# Patient Record
Sex: MALE | Race: WHITE
[De-identification: names, ages, dates, MRNs, and addresses within clinical notes are randomized per-mention and may not be internally consistent; named-entity substitution may affect disease eponyms.]

---

## 2017-04-10 ENCOUNTER — HOSPITAL ENCOUNTER (OUTPATIENT)
Dept: HOSPITAL 35 - RAD | Age: 82
End: 2017-04-10
Attending: INTERNAL MEDICINE
Payer: COMMERCIAL

## 2017-04-10 DIAGNOSIS — R06.02: ICD-10-CM

## 2017-04-10 DIAGNOSIS — J32.9: Primary | ICD-10-CM

## 2017-04-11 ENCOUNTER — HOSPITAL ENCOUNTER (OUTPATIENT)
Dept: HOSPITAL 35 - RAD | Age: 82
End: 2017-04-11
Attending: INTERNAL MEDICINE
Payer: COMMERCIAL

## 2017-04-11 DIAGNOSIS — J98.6: Primary | ICD-10-CM

## 2017-05-25 ENCOUNTER — HOSPITAL ENCOUNTER (OUTPATIENT)
Dept: HOSPITAL 61 - PCVCCLINIC | Age: 82
Discharge: HOME | End: 2017-05-25
Attending: INTERNAL MEDICINE
Payer: COMMERCIAL

## 2017-05-25 DIAGNOSIS — I10: ICD-10-CM

## 2017-05-25 DIAGNOSIS — I49.5: ICD-10-CM

## 2017-05-25 DIAGNOSIS — E13.9: ICD-10-CM

## 2017-05-25 DIAGNOSIS — I48.0: Primary | ICD-10-CM

## 2017-05-25 DIAGNOSIS — Z79.899: ICD-10-CM

## 2017-05-25 DIAGNOSIS — E78.00: ICD-10-CM

## 2017-05-25 DIAGNOSIS — I44.2: ICD-10-CM

## 2017-05-25 DIAGNOSIS — I65.23: ICD-10-CM

## 2017-05-25 DIAGNOSIS — I25.10: ICD-10-CM

## 2017-05-25 PROCEDURE — 93005 ELECTROCARDIOGRAM TRACING: CPT

## 2017-05-25 PROCEDURE — 80061 LIPID PANEL: CPT

## 2017-05-25 PROCEDURE — G0463 HOSPITAL OUTPT CLINIC VISIT: HCPCS

## 2017-12-01 ENCOUNTER — HOSPITAL ENCOUNTER (OUTPATIENT)
Dept: HOSPITAL 61 - PCVCIMAG | Age: 82
Discharge: HOME | End: 2017-12-01
Attending: INTERNAL MEDICINE
Payer: COMMERCIAL

## 2017-12-01 DIAGNOSIS — I25.10: ICD-10-CM

## 2017-12-01 DIAGNOSIS — E11.9: ICD-10-CM

## 2017-12-01 DIAGNOSIS — Z95.0: ICD-10-CM

## 2017-12-01 DIAGNOSIS — I10: ICD-10-CM

## 2017-12-01 DIAGNOSIS — R06.00: ICD-10-CM

## 2017-12-01 DIAGNOSIS — I48.0: ICD-10-CM

## 2017-12-01 DIAGNOSIS — I35.1: Primary | ICD-10-CM

## 2017-12-01 PROCEDURE — 93306 TTE W/DOPPLER COMPLETE: CPT

## 2017-12-01 PROCEDURE — 93005 ELECTROCARDIOGRAM TRACING: CPT

## 2017-12-04 NOTE — PCVCIMAG
--------------- APPROVED REPORT --------------





Study performed:  12/01/2017 08:52:42



EXAM: Comprehensive 2D, Doppler, and color-flow 

Echocardiogram

Patient Location: Echo lab

Room #:  2Status:  routine



BSA:         2.14

HR: 60 bpm

Rhythm: Pacemaker



Other Information 

Study Quality: Technically DifficultGood



Risk Factors: 

Cardiac Risk Factors:  DM, 

Hyperlipidemia



Indications

Diabetes

Dyspnea 

Pacemaker

CAD

Hx sick sinus syndrome



2D Dimensions

LVEF(%):  64.63 (&gt;50%)

IVSd:  8.02 (7-11mm)LVOT Diam:  21.53 (18-24mm) 

LVDd:  40.32 mm

PWd:  7.24 (7-11mm)Ascending Ao:  32.33 (22-36mm)

LVDs:  26.27 (25-40mm)

Left Atrium:  23.38 (27-40mm)

Aortic Root:  29.98 mm

LV Single Plane 4CH:  53.61 %

LV Single Plane 2CH:  62.65 %[place] LVEF:  58.13 %

Biplane EF:  60.6 %



Volumes

Left Atrial Volume (Systole)

Single Plane 4CH:  69.04 mLSingle Plane 2CH:  64.44 mL

Biplane LA Volume:  69.00 mLLA ESV Index:  32.00 mL/m2



Aortic Valve

AoV Peak Tim.:  1.36 m/s

AO Peak Gr.:  7.40 mmHgLVOT Max PG:  3.33 mmHg

LVOT Max V:  0.91 m/s

DHAVAL Vmax: 2.44 cm2

AI Vmax:  3.97 m/s

AI [place]:  2.44 m/s2

AI PHT:  471.50 ms



Mitral Valve

E/A Ratio:  0.6

MV Decel. Time:  235.48 ms

MV E Max Tim.:  0.49 m/s

MV A Tim.:  0.89 m/s

IVRT:  103.81 ms



TDI

E/Medial E':  9.80

Medial E' Tim.:  0.05 m/s



Pulmonary Valve

PV Peak Tim.:  1.18 m/sPV Peak Gr.:  5.57 mmHg



Pulmonary Vein

P Vein S:    0.61 m/sP Vein A:  0.34 m/s

P Vein D:   0.25 m/sP Vein A Dur.:  100.3 msec

P Vein S/D Ratio:  2.44



Tricuspid Valve

TR Peak Tim.:  2.05 m/s

TR Peak Gr.:  16.84 mmHg

TV Vmax:  0.48 m/sPA Pressure:  24.00 mmHg



Left Ventricle

The left ventricle is normal size. There is normal LV segmental wall 

motion. There is normal left ventricular wall thickness. Left 

ventricular systolic function is normal. The left ventricular 

ejection fraction is within the normal range. LVEF is 50% range ant 

apical hypo Grade I - abnormal relaxation pattern.



Right Ventricle

The right ventricle is normal size. The right ventricular systolic 

function is normal.



Atria

The left atrium size is normal. Pacemaker lead is present in the 

right atrium.



Aortic Valve

Aortic valve is trileaflet. Aortic valve leaflets are mildly 

sclerotic. Mild aortic regurgitation. There is no aortic valvular 

stenosis.



Mitral Valve

The mitral valve is normal in structure. There is no mitral valve 

regurgitation noted. No evidence of mitral valve stenosis.



Tricuspid Valve

The tricuspid valve is normal in structure. Trace tricuspid 

regurgitation.



Pulmonic Valve

The pulmonary valve is normal in structure. There is no pulmonic 

valvular regurgitation.



Great Vessels

The aortic root is normal in size. The ascending aorta is normal in 

size. IVC is normal in size and collapses with &gt;50% 

inspiration



Pericardium

There is no pericardial effusion. There is no pleural 

effusion.



&lt;Conclusion&gt;

The left ventricle is normal size.

LVEF is 50% range

Grade I - abnormal relaxation pattern.

Pacemaker lead is present in the right atrium.

Mild aortic regurgitation.

There is no mitral valve regurgitation noted.

There is no pericardial effusion.

LVEF is 50% range ant apical hypo

## 2018-02-28 ENCOUNTER — HOSPITAL ENCOUNTER (OUTPATIENT)
Dept: HOSPITAL 61 - PCVCCLINIC | Age: 83
Discharge: HOME | End: 2018-02-28
Attending: INTERNAL MEDICINE
Payer: COMMERCIAL

## 2018-02-28 DIAGNOSIS — Z95.0: ICD-10-CM

## 2018-02-28 DIAGNOSIS — I25.10: Primary | ICD-10-CM

## 2018-02-28 DIAGNOSIS — I10: ICD-10-CM

## 2018-02-28 DIAGNOSIS — Z79.899: ICD-10-CM

## 2018-02-28 DIAGNOSIS — I77.9: ICD-10-CM

## 2018-02-28 DIAGNOSIS — E78.00: ICD-10-CM

## 2018-02-28 DIAGNOSIS — I49.5: ICD-10-CM

## 2018-02-28 DIAGNOSIS — I48.91: ICD-10-CM

## 2018-02-28 PROCEDURE — 80061 LIPID PANEL: CPT

## 2018-02-28 PROCEDURE — 93280 PM DEVICE PROGR EVAL DUAL: CPT

## 2018-11-14 ENCOUNTER — HOSPITAL ENCOUNTER (OUTPATIENT)
Dept: HOSPITAL 61 - PCVCCLINIC | Age: 83
Discharge: HOME | End: 2018-11-14
Attending: INTERNAL MEDICINE
Payer: COMMERCIAL

## 2018-11-14 DIAGNOSIS — I49.5: ICD-10-CM

## 2018-11-14 DIAGNOSIS — E11.9: ICD-10-CM

## 2018-11-14 DIAGNOSIS — Z95.0: ICD-10-CM

## 2018-11-14 DIAGNOSIS — I25.10: ICD-10-CM

## 2018-11-14 DIAGNOSIS — I48.0: ICD-10-CM

## 2018-11-14 DIAGNOSIS — I44.2: ICD-10-CM

## 2018-11-14 DIAGNOSIS — E78.5: ICD-10-CM

## 2018-11-14 DIAGNOSIS — I42.8: ICD-10-CM

## 2018-11-14 DIAGNOSIS — I10: Primary | ICD-10-CM

## 2018-11-14 PROCEDURE — 93280 PM DEVICE PROGR EVAL DUAL: CPT

## 2018-11-14 PROCEDURE — G0463 HOSPITAL OUTPT CLINIC VISIT: HCPCS

## 2019-07-12 ENCOUNTER — HOSPITAL ENCOUNTER (INPATIENT)
Dept: HOSPITAL 35 - ER | Age: 84
LOS: 6 days | Discharge: NURSING, REHAB OR LONG-TERM CARE | DRG: 380 | End: 2019-07-18
Attending: FAMILY MEDICINE | Admitting: FAMILY MEDICINE
Payer: COMMERCIAL

## 2019-07-12 VITALS — SYSTOLIC BLOOD PRESSURE: 112 MMHG | DIASTOLIC BLOOD PRESSURE: 46 MMHG

## 2019-07-12 VITALS — DIASTOLIC BLOOD PRESSURE: 44 MMHG | SYSTOLIC BLOOD PRESSURE: 114 MMHG

## 2019-07-12 VITALS — DIASTOLIC BLOOD PRESSURE: 50 MMHG | SYSTOLIC BLOOD PRESSURE: 127 MMHG

## 2019-07-12 VITALS — DIASTOLIC BLOOD PRESSURE: 57 MMHG | SYSTOLIC BLOOD PRESSURE: 136 MMHG

## 2019-07-12 VITALS — DIASTOLIC BLOOD PRESSURE: 50 MMHG | SYSTOLIC BLOOD PRESSURE: 134 MMHG

## 2019-07-12 VITALS — WEIGHT: 210.57 LBS | BODY MASS INDEX: 31.19 KG/M2 | HEIGHT: 69.02 IN

## 2019-07-12 VITALS — DIASTOLIC BLOOD PRESSURE: 53 MMHG | SYSTOLIC BLOOD PRESSURE: 119 MMHG

## 2019-07-12 VITALS — SYSTOLIC BLOOD PRESSURE: 100 MMHG | DIASTOLIC BLOOD PRESSURE: 43 MMHG

## 2019-07-12 VITALS — DIASTOLIC BLOOD PRESSURE: 51 MMHG | SYSTOLIC BLOOD PRESSURE: 124 MMHG

## 2019-07-12 VITALS — DIASTOLIC BLOOD PRESSURE: 43 MMHG | SYSTOLIC BLOOD PRESSURE: 95 MMHG

## 2019-07-12 VITALS — DIASTOLIC BLOOD PRESSURE: 48 MMHG | SYSTOLIC BLOOD PRESSURE: 120 MMHG

## 2019-07-12 VITALS — SYSTOLIC BLOOD PRESSURE: 93 MMHG | DIASTOLIC BLOOD PRESSURE: 41 MMHG

## 2019-07-12 VITALS — SYSTOLIC BLOOD PRESSURE: 117 MMHG | DIASTOLIC BLOOD PRESSURE: 60 MMHG

## 2019-07-12 VITALS — SYSTOLIC BLOOD PRESSURE: 119 MMHG | DIASTOLIC BLOOD PRESSURE: 53 MMHG

## 2019-07-12 VITALS — DIASTOLIC BLOOD PRESSURE: 44 MMHG | SYSTOLIC BLOOD PRESSURE: 140 MMHG

## 2019-07-12 VITALS — DIASTOLIC BLOOD PRESSURE: 42 MMHG | SYSTOLIC BLOOD PRESSURE: 100 MMHG

## 2019-07-12 DIAGNOSIS — Z95.0: ICD-10-CM

## 2019-07-12 DIAGNOSIS — D62: ICD-10-CM

## 2019-07-12 DIAGNOSIS — E78.5: ICD-10-CM

## 2019-07-12 DIAGNOSIS — Z90.49: ICD-10-CM

## 2019-07-12 DIAGNOSIS — M54.9: ICD-10-CM

## 2019-07-12 DIAGNOSIS — E11.9: ICD-10-CM

## 2019-07-12 DIAGNOSIS — I48.91: ICD-10-CM

## 2019-07-12 DIAGNOSIS — Z94.7: ICD-10-CM

## 2019-07-12 DIAGNOSIS — I25.10: ICD-10-CM

## 2019-07-12 DIAGNOSIS — I49.5: ICD-10-CM

## 2019-07-12 DIAGNOSIS — K22.11: Primary | ICD-10-CM

## 2019-07-12 DIAGNOSIS — I42.9: ICD-10-CM

## 2019-07-12 DIAGNOSIS — Z79.899: ICD-10-CM

## 2019-07-12 DIAGNOSIS — K44.9: ICD-10-CM

## 2019-07-12 DIAGNOSIS — G89.29: ICD-10-CM

## 2019-07-12 DIAGNOSIS — E43: ICD-10-CM

## 2019-07-12 DIAGNOSIS — D50.9: ICD-10-CM

## 2019-07-12 LAB
ALBUMIN SERPL-MCNC: 2.7 G/DL (ref 3.4–5)
ALT SERPL-CCNC: 17 U/L (ref 30–65)
ANION GAP SERPL CALC-SCNC: 8 MMOL/L (ref 7–16)
APTT BLD: 23.3 SECONDS (ref 24.5–32.8)
AST SERPL-CCNC: 18 U/L (ref 15–37)
BASOPHILS NFR BLD AUTO: 0.5 % (ref 0–2)
BILIRUB SERPL-MCNC: 0.8 MG/DL
BUN SERPL-MCNC: 82 MG/DL (ref 7–18)
CALCIUM SERPL-MCNC: 9 MG/DL (ref 8.5–10.1)
CHLORIDE SERPL-SCNC: 109 MMOL/L (ref 98–107)
CO2 SERPL-SCNC: 30 MMOL/L (ref 21–32)
CREAT SERPL-MCNC: 1.7 MG/DL (ref 0.7–1.3)
EOSINOPHIL NFR BLD: 0.8 % (ref 0–3)
ERYTHROCYTE [DISTWIDTH] IN BLOOD BY AUTOMATED COUNT: 13.5 % (ref 10.5–14.5)
FIBRINOGEN PPP-MCNC: 286.5 MG/DL (ref 210–360)
GLUCOSE SERPL-MCNC: 233 MG/DL (ref 74–106)
GRANULOCYTES NFR BLD MANUAL: 67.5 % (ref 36–66)
HCT VFR BLD CALC: 28.2 % (ref 42–52)
HGB BLD-MCNC: 9.4 GM/DL (ref 14–18)
INR PPP: 1.1
LYMPHOCYTES NFR BLD AUTO: 23.1 % (ref 24–44)
MCH RBC QN AUTO: 33.8 PG (ref 26–34)
MCHC RBC AUTO-ENTMCNC: 33.3 G/DL (ref 28–37)
MCV RBC: 101.5 FL (ref 80–100)
MONOCYTES NFR BLD: 8.1 % (ref 1–8)
NEUTROPHILS # BLD: 5.7 THOU/UL (ref 1.4–8.2)
PLATELET # BLD: 219 THOU/UL (ref 150–400)
POTASSIUM SERPL-SCNC: 4.5 MMOL/L (ref 3.5–5.1)
PROT SERPL-MCNC: 5.6 G/DL (ref 6.4–8.2)
PROTHROMBIN TIME: 11.7 SECONDS (ref 9.3–11.4)
RBC # BLD AUTO: 2.78 MIL/UL (ref 4.5–6)
SODIUM SERPL-SCNC: 147 MMOL/L (ref 136–145)
WBC # BLD AUTO: 8.4 THOU/UL (ref 4–11)

## 2019-07-12 PROCEDURE — 62110: CPT

## 2019-07-12 PROCEDURE — 70005: CPT

## 2019-07-12 PROCEDURE — 85076: CPT

## 2019-07-12 PROCEDURE — 10783: CPT

## 2019-07-12 PROCEDURE — 62900: CPT

## 2019-07-12 PROCEDURE — 10078: CPT

## 2019-07-12 NOTE — NUR
ADMITTED TO ICU.  [id number] VERY PLEASANT AND COOPERATIVE.  WENT TO GI LAB FOR A
EGD.  UPON RETURN C/O BACK PAIN.  FENTANYL GIVEN WITH GOOD RELIEF.  IV
INFUSING.  TAKING IN WATER AND ICE CHIIPS WITH GOOD TOLERATION.  WIFE AT
BEDSIDE.

## 2019-07-13 VITALS — SYSTOLIC BLOOD PRESSURE: 110 MMHG | DIASTOLIC BLOOD PRESSURE: 36 MMHG

## 2019-07-13 VITALS — SYSTOLIC BLOOD PRESSURE: 142 MMHG | DIASTOLIC BLOOD PRESSURE: 49 MMHG

## 2019-07-13 VITALS — DIASTOLIC BLOOD PRESSURE: 45 MMHG | SYSTOLIC BLOOD PRESSURE: 117 MMHG

## 2019-07-13 VITALS — SYSTOLIC BLOOD PRESSURE: 117 MMHG | DIASTOLIC BLOOD PRESSURE: 72 MMHG

## 2019-07-13 VITALS — SYSTOLIC BLOOD PRESSURE: 109 MMHG | DIASTOLIC BLOOD PRESSURE: 36 MMHG

## 2019-07-13 VITALS — SYSTOLIC BLOOD PRESSURE: 128 MMHG | DIASTOLIC BLOOD PRESSURE: 49 MMHG

## 2019-07-13 VITALS — DIASTOLIC BLOOD PRESSURE: 41 MMHG | SYSTOLIC BLOOD PRESSURE: 116 MMHG

## 2019-07-13 VITALS — DIASTOLIC BLOOD PRESSURE: 43 MMHG | SYSTOLIC BLOOD PRESSURE: 112 MMHG

## 2019-07-13 VITALS — SYSTOLIC BLOOD PRESSURE: 102 MMHG | DIASTOLIC BLOOD PRESSURE: 46 MMHG

## 2019-07-13 VITALS — DIASTOLIC BLOOD PRESSURE: 50 MMHG | SYSTOLIC BLOOD PRESSURE: 138 MMHG

## 2019-07-13 VITALS — DIASTOLIC BLOOD PRESSURE: 38 MMHG | SYSTOLIC BLOOD PRESSURE: 110 MMHG

## 2019-07-13 VITALS — SYSTOLIC BLOOD PRESSURE: 115 MMHG | DIASTOLIC BLOOD PRESSURE: 38 MMHG

## 2019-07-13 VITALS — SYSTOLIC BLOOD PRESSURE: 112 MMHG | DIASTOLIC BLOOD PRESSURE: 35 MMHG

## 2019-07-13 VITALS — SYSTOLIC BLOOD PRESSURE: 111 MMHG | DIASTOLIC BLOOD PRESSURE: 41 MMHG

## 2019-07-13 VITALS — SYSTOLIC BLOOD PRESSURE: 115 MMHG | DIASTOLIC BLOOD PRESSURE: 36 MMHG

## 2019-07-13 VITALS — DIASTOLIC BLOOD PRESSURE: 57 MMHG | SYSTOLIC BLOOD PRESSURE: 137 MMHG

## 2019-07-13 VITALS — DIASTOLIC BLOOD PRESSURE: 60 MMHG | SYSTOLIC BLOOD PRESSURE: 137 MMHG

## 2019-07-13 VITALS — DIASTOLIC BLOOD PRESSURE: 51 MMHG | SYSTOLIC BLOOD PRESSURE: 153 MMHG

## 2019-07-13 VITALS — SYSTOLIC BLOOD PRESSURE: 127 MMHG | DIASTOLIC BLOOD PRESSURE: 62 MMHG

## 2019-07-13 NOTE — NUR
ASSESSMENTS AND VITAL SIGNS AS DOCUMENTED. PATIENT SAT IN CHAIR TODAY, MAX
ASSIST X2 TO CHAIR. PATIENT WIFE AT BEDSIDE TODAY, ASSISTED PATIENT AS SHE
COULD. HE EXPRESSED PAIN THAT WAS PARTIALLY RELIEVED BY ORAL PAIN MEDICATION.
HE DENIED NAUSEA TODAY. NO EVIDENCE OF BLEEDING. PLAN OF CARE IS TO CONTINUE
TO MONITOR FOR S/S OF BLEEDING, IMPROVED PAIN MANAGEMENT, INCREASED ACTIVITY,
MONITOR ASSESSMENTS AND VITAL SIGNS.

## 2019-07-13 NOTE — NUR
Pt slept off and on through the night. VS stable with no further signs of
bleeding observed. PRN fentanyl given for c/o back pain with desired effect
achieved. Pt had small amount of dark emesis last pm and PRN zofran given with
no further complaints of nausea. Voiding per urinal without difficulty and no
BM observed. Continue with POC.

## 2019-07-14 VITALS — DIASTOLIC BLOOD PRESSURE: 33 MMHG | SYSTOLIC BLOOD PRESSURE: 102 MMHG

## 2019-07-14 VITALS — DIASTOLIC BLOOD PRESSURE: 40 MMHG | SYSTOLIC BLOOD PRESSURE: 91 MMHG

## 2019-07-14 VITALS — SYSTOLIC BLOOD PRESSURE: 109 MMHG | DIASTOLIC BLOOD PRESSURE: 42 MMHG

## 2019-07-14 VITALS — DIASTOLIC BLOOD PRESSURE: 39 MMHG | SYSTOLIC BLOOD PRESSURE: 101 MMHG

## 2019-07-14 VITALS — DIASTOLIC BLOOD PRESSURE: 38 MMHG | SYSTOLIC BLOOD PRESSURE: 110 MMHG

## 2019-07-14 VITALS — DIASTOLIC BLOOD PRESSURE: 48 MMHG | SYSTOLIC BLOOD PRESSURE: 118 MMHG

## 2019-07-14 VITALS — DIASTOLIC BLOOD PRESSURE: 42 MMHG | SYSTOLIC BLOOD PRESSURE: 116 MMHG

## 2019-07-14 VITALS — DIASTOLIC BLOOD PRESSURE: 45 MMHG | SYSTOLIC BLOOD PRESSURE: 120 MMHG

## 2019-07-14 VITALS — SYSTOLIC BLOOD PRESSURE: 119 MMHG | DIASTOLIC BLOOD PRESSURE: 40 MMHG

## 2019-07-14 VITALS — DIASTOLIC BLOOD PRESSURE: 43 MMHG | SYSTOLIC BLOOD PRESSURE: 118 MMHG

## 2019-07-14 VITALS — DIASTOLIC BLOOD PRESSURE: 42 MMHG | SYSTOLIC BLOOD PRESSURE: 112 MMHG

## 2019-07-14 VITALS — SYSTOLIC BLOOD PRESSURE: 103 MMHG | DIASTOLIC BLOOD PRESSURE: 42 MMHG

## 2019-07-14 VITALS — DIASTOLIC BLOOD PRESSURE: 42 MMHG | SYSTOLIC BLOOD PRESSURE: 117 MMHG

## 2019-07-14 VITALS — SYSTOLIC BLOOD PRESSURE: 107 MMHG | DIASTOLIC BLOOD PRESSURE: 41 MMHG

## 2019-07-14 VITALS — DIASTOLIC BLOOD PRESSURE: 46 MMHG | SYSTOLIC BLOOD PRESSURE: 120 MMHG

## 2019-07-14 VITALS — SYSTOLIC BLOOD PRESSURE: 106 MMHG | DIASTOLIC BLOOD PRESSURE: 38 MMHG

## 2019-07-14 VITALS — SYSTOLIC BLOOD PRESSURE: 122 MMHG | DIASTOLIC BLOOD PRESSURE: 52 MMHG

## 2019-07-14 VITALS — DIASTOLIC BLOOD PRESSURE: 42 MMHG | SYSTOLIC BLOOD PRESSURE: 91 MMHG

## 2019-07-14 VITALS — SYSTOLIC BLOOD PRESSURE: 105 MMHG | DIASTOLIC BLOOD PRESSURE: 31 MMHG

## 2019-07-14 VITALS — SYSTOLIC BLOOD PRESSURE: 109 MMHG | DIASTOLIC BLOOD PRESSURE: 41 MMHG

## 2019-07-14 VITALS — SYSTOLIC BLOOD PRESSURE: 112 MMHG | DIASTOLIC BLOOD PRESSURE: 46 MMHG

## 2019-07-14 VITALS — SYSTOLIC BLOOD PRESSURE: 103 MMHG | DIASTOLIC BLOOD PRESSURE: 41 MMHG

## 2019-07-14 VITALS — SYSTOLIC BLOOD PRESSURE: 117 MMHG | DIASTOLIC BLOOD PRESSURE: 42 MMHG

## 2019-07-14 VITALS — SYSTOLIC BLOOD PRESSURE: 116 MMHG | DIASTOLIC BLOOD PRESSURE: 41 MMHG

## 2019-07-14 VITALS — DIASTOLIC BLOOD PRESSURE: 46 MMHG | SYSTOLIC BLOOD PRESSURE: 115 MMHG

## 2019-07-14 VITALS — SYSTOLIC BLOOD PRESSURE: 111 MMHG | DIASTOLIC BLOOD PRESSURE: 53 MMHG

## 2019-07-14 VITALS — SYSTOLIC BLOOD PRESSURE: 110 MMHG | DIASTOLIC BLOOD PRESSURE: 38 MMHG

## 2019-07-14 VITALS — DIASTOLIC BLOOD PRESSURE: 35 MMHG | SYSTOLIC BLOOD PRESSURE: 94 MMHG

## 2019-07-14 LAB
ERYTHROCYTE [DISTWIDTH] IN BLOOD BY AUTOMATED COUNT: 14 % (ref 10.5–14.5)
HCT VFR BLD CALC: 19.3 % (ref 42–52)
HCT VFR BLD CALC: 28.5 % (ref 42–52)
HGB BLD-MCNC: 6.6 GM/DL (ref 14–18)
HGB BLD-MCNC: 9.7 GM/DL (ref 14–18)
MCH RBC QN AUTO: 35.1 PG (ref 26–34)
MCHC RBC AUTO-ENTMCNC: 34.1 G/DL (ref 28–37)
MCV RBC: 103 FL (ref 80–100)
PLATELET # BLD: 131 THOU/UL (ref 150–400)
RBC # BLD AUTO: 1.88 MIL/UL (ref 4.5–6)
WBC # BLD AUTO: 5.1 THOU/UL (ref 4–11)

## 2019-07-14 NOTE — NUR
PATIENT ALERT AND ORIENTED X4, HARD OF HEARING. PACED ON TELEMETRY MONITOR. ON
ROOM AIR, NO COMPLAINTS OF DIFFICULTY BREATING. TOLERATING SOFT DIET WELL, NO
COMPLAINTS OF NAUSEA/VOMITING. UP WITH X2 ASSISTANCE AND GAIT BELT. SPOUSE AND
PATIENT UPDATED ON THE PLAN OF CARE. NO SIGNS OF ACUTE DISTRESS NOTED AT THIS
TIME. WILL CONTINUE TO MONITOR.

## 2019-07-14 NOTE — NUR
SEE [employer] FOR ASSESSMENT. PT SLEEPING OFF/ON. VSS. NO S/S OF BLEEDING
HOWEVER HBG 6.6 THIS AM. WILL TRANSFUSE PER DR RAHMAN

## 2019-07-15 VITALS — SYSTOLIC BLOOD PRESSURE: 106 MMHG | DIASTOLIC BLOOD PRESSURE: 40 MMHG

## 2019-07-15 VITALS — SYSTOLIC BLOOD PRESSURE: 137 MMHG | DIASTOLIC BLOOD PRESSURE: 59 MMHG

## 2019-07-15 VITALS — DIASTOLIC BLOOD PRESSURE: 36 MMHG | SYSTOLIC BLOOD PRESSURE: 115 MMHG

## 2019-07-15 VITALS — DIASTOLIC BLOOD PRESSURE: 46 MMHG | SYSTOLIC BLOOD PRESSURE: 115 MMHG

## 2019-07-15 VITALS — DIASTOLIC BLOOD PRESSURE: 56 MMHG | SYSTOLIC BLOOD PRESSURE: 126 MMHG

## 2019-07-15 VITALS — SYSTOLIC BLOOD PRESSURE: 108 MMHG | DIASTOLIC BLOOD PRESSURE: 58 MMHG

## 2019-07-15 VITALS — SYSTOLIC BLOOD PRESSURE: 124 MMHG | DIASTOLIC BLOOD PRESSURE: 50 MMHG

## 2019-07-15 VITALS — DIASTOLIC BLOOD PRESSURE: 60 MMHG | SYSTOLIC BLOOD PRESSURE: 153 MMHG

## 2019-07-15 VITALS — SYSTOLIC BLOOD PRESSURE: 130 MMHG | DIASTOLIC BLOOD PRESSURE: 61 MMHG

## 2019-07-15 VITALS — DIASTOLIC BLOOD PRESSURE: 47 MMHG | SYSTOLIC BLOOD PRESSURE: 110 MMHG

## 2019-07-15 VITALS — SYSTOLIC BLOOD PRESSURE: 106 MMHG | DIASTOLIC BLOOD PRESSURE: 49 MMHG

## 2019-07-15 NOTE — NUR
PT ADMITTED RELATED TO GI BLEED. CM REVIEWED CHART AND SPOKE WITH CARE TEAM.
CM MET WITH PT AND SPOUSE AT BEDSIDE THIS DAY. PT IS A&O X4 BUT VERY SOFT
SPOKEN. CM ROLE INTRODUCED. PT INDICATED HE LIVES IN A SPLIT LEVEL HOUSE
WITH HIS SPOUSE WITH 7 STEPS TO GET TO MAIN LEVEL AND 7 MORE STEPS TO BEDROOMS
AND BATHROOMS. PT INDICATED HE HAD USED A 4WW WITH A SEAT AND A CANE TO ASSIST
WITH MOBILITY PTA. PT INDICATED NO RECENT HH HX. PT INDICATED THAT HE HOPES TO
BE ABLE TO RETURN HOME ONCE MEDICALLY STABLE BUT THAT THEY WOULD BE CECEPTIVE
TO A SHORT TERM POST ACUTE CARE STAY IF INDICATED. CM TO FOLLOW AS INDICATED
WITH DC PLANNING.

## 2019-07-15 NOTE — NUR
PT ARRIVED FROM ICU TO ROOM 428 AT 10:17 VIA WHEELCHAIR. WIFE AT BEDSIDE.
ASSESSMENT AS CHARTED. PT C/O CHRONIC LOWER BACK PAIN, INTERNAL SPINAL
STIMULATOR IN PLACE. VSS. PT TRANSFERRED FROM W/C TO BED WITH GAIT BELT AND X1
ASSIST, PT C/O WEAKNESS. PACEMAKER IN PLACE. ROOM AIR. WILL CONTINUE TO
MONITOR.

## 2019-07-15 NOTE — NUR
ASSUMED CARE OF PT AT 2040 YESTERDAY. NO CHANGES OVERNIGHT. PT SLEEPING, BUT
WAKES EASILY AND IS ORIENTED. NC PLACED ON PT WITH 2L O2; PT'S O2 WOULD DROP
INTO THE 80s INTERMITTENTLY WHILE SLEEPING. PT HAS A HX OF SLEEP APNEA AND
SAYS HE WEARS A CPAP AT NIGHT AT HOME, BUT DID NOT LIKE THE CPAP MASK HERE. NO
SIGNS OF BLEEDING OVERNIGHT. PT IS PROGRESSING. WILL CONTINUE TO MONITOR.

## 2019-07-16 VITALS — SYSTOLIC BLOOD PRESSURE: 133 MMHG | DIASTOLIC BLOOD PRESSURE: 50 MMHG

## 2019-07-16 VITALS — DIASTOLIC BLOOD PRESSURE: 64 MMHG | SYSTOLIC BLOOD PRESSURE: 142 MMHG

## 2019-07-16 VITALS — SYSTOLIC BLOOD PRESSURE: 145 MMHG | DIASTOLIC BLOOD PRESSURE: 66 MMHG

## 2019-07-16 VITALS — DIASTOLIC BLOOD PRESSURE: 53 MMHG | SYSTOLIC BLOOD PRESSURE: 143 MMHG

## 2019-07-16 LAB
ANION GAP SERPL CALC-SCNC: 6 MMOL/L (ref 7–16)
BUN SERPL-MCNC: 23 MG/DL (ref 7–18)
CALCIUM SERPL-MCNC: 8 MG/DL (ref 8.5–10.1)
CHLORIDE SERPL-SCNC: 116 MMOL/L (ref 98–107)
CO2 SERPL-SCNC: 25 MMOL/L (ref 21–32)
CREAT SERPL-MCNC: 0.9 MG/DL (ref 0.7–1.3)
ERYTHROCYTE [DISTWIDTH] IN BLOOD BY AUTOMATED COUNT: 16.8 % (ref 10.5–14.5)
GLUCOSE SERPL-MCNC: 125 MG/DL (ref 74–106)
HCT VFR BLD CALC: 26.4 % (ref 42–52)
HGB BLD-MCNC: 9.1 GM/DL (ref 14–18)
MCH RBC QN AUTO: 33.3 PG (ref 26–34)
MCHC RBC AUTO-ENTMCNC: 34.2 G/DL (ref 28–37)
MCV RBC: 97.2 FL (ref 80–100)
PLATELET # BLD: 140 THOU/UL (ref 150–400)
POTASSIUM SERPL-SCNC: 3.7 MMOL/L (ref 3.5–5.1)
RBC # BLD AUTO: 2.72 MIL/UL (ref 4.5–6)
SODIUM SERPL-SCNC: 147 MMOL/L (ref 136–145)
WBC # BLD AUTO: 4.3 THOU/UL (ref 4–11)

## 2019-07-16 NOTE — NUR
ASSUMED CARE OF PATIENT AT 0715, PATIENT ALERT AND ORIENTED X 4. PATIENT UP
WITH ASSIST X 1 WITH GAIT BELT AND WALKER. PATIENT C/O PAIN WITH BACK AREA,
RECEIVED HYDROCODONE X 2 THIS SHIFT. PATIENT HAS LEFT FOREARM IV IN PLACE, IV
FLUIDS D/C THIS AM. NO BLOOD NOTED, STOOL BLACK IN COLOR. PATIENT HAS EDEMA TO
BILATERAL LOWER EXTREMITIES, AND LEFT ARM HAS SWELLING AND BRUISING. BLOOD
SUGAR MONITORING AS ORDERED. WILL CONTINUE MONITOR.

## 2019-07-16 NOTE — NUR
CARE TEAM ARE RECOMMENDING POST ACUTE CARE STAY. CM MET WITH PT AND SPOUSE AT
BEDSIDE THIS DAY AND PROVIDED [facility] LIST FOR THEM TO REVIEW. THEY ASKED
THAT REFERRALS BE SENT TO [facility], AND [facility]. CM TO
FOLLOW AS INDICATED WITH DC PLANNING.

## 2019-07-16 NOTE — NUR
FAXED REFERRAL TO [facility] LEFT MSG WITH STUART THAT PT TO DC TOMORROW IF SHE
CAN ACCEPT TO SUBMIT FOR AUTH PT'S FIRST CHOICE.,
FAXED REFERRAL TO [facility] SPOKE WITH DAYSI IN ADM SHE RECEIVED REFERRAL
AND CAN ACCEPT PT SHE WILL NOT SUBMIT FOR AUTH UNTIL WE NOTIFY HER.
[place]ED [place]FERR[place] [facility] [place] [facility] [place]SE ANGEL IN ADM TO REVIEW AND NOT
TO SUBMIT FOR AUTH. DCP TO FOLLOW.

## 2019-07-16 NOTE — NUR
Assumed pt care at 1900. Pt A/OX4,very soft spoken. VSS. C/o back pain
medicated with Hydrocodone per request with relief reported and pt able to get
some sleep. IVF infusing via LFA without any problems. Voiding per urinal at
night. O2 on @ 2L/NC,no EUCEDA reported. Edema persists on BLE/RUE encouraged to
elevate extremities while in bed. Fall precautions implemented,call
light/personal items within reach. Will continue to monitor pt.

## 2019-07-17 VITALS — DIASTOLIC BLOOD PRESSURE: 69 MMHG | SYSTOLIC BLOOD PRESSURE: 153 MMHG

## 2019-07-17 VITALS — DIASTOLIC BLOOD PRESSURE: 75 MMHG | SYSTOLIC BLOOD PRESSURE: 146 MMHG

## 2019-07-17 VITALS — DIASTOLIC BLOOD PRESSURE: 66 MMHG | SYSTOLIC BLOOD PRESSURE: 146 MMHG

## 2019-07-17 VITALS — SYSTOLIC BLOOD PRESSURE: 136 MMHG | DIASTOLIC BLOOD PRESSURE: 71 MMHG

## 2019-07-17 NOTE — NUR
Assumed pt care at 1900. Pt A/OX4,VSS.C/o back pain on the right side
medicated with Hydrocodone 10mg with partial relief reported. Voiding per
urinal without any difficulties.Fall precautions in place.

## 2019-07-17 NOTE — NUR
Assumed care of pt at 0700. Pt a&ox4. Up with 1 assist, gaitbelt + walker.
Pain controlled with prn pain meds. Awaiting to be discharged to facility
after ins. authoriz. Call light within reach. Family at bedside. Fall
precautions in place. Will continue to monitor.

## 2019-07-18 VITALS — DIASTOLIC BLOOD PRESSURE: 66 MMHG | SYSTOLIC BLOOD PRESSURE: 152 MMHG

## 2019-07-18 VITALS — SYSTOLIC BLOOD PRESSURE: 121 MMHG | DIASTOLIC BLOOD PRESSURE: 51 MMHG

## 2019-07-18 LAB
HCT VFR BLD CALC: 28.8 % (ref 42–52)
HGB BLD-MCNC: 9.8 GM/DL (ref 14–18)

## 2019-07-18 NOTE — NUR
ASSUMED PT CARE AT 1900.PT WAS OBSERVED SITTING UP IN THE RECLINER IN HIS
ROOM,WATCHING TV AT START OF SHIFT.PT C/O PAIN MED ADMINISTERED BY AM
NURSE.PT'S CARE TRANSFERREED TO ANOTHER NURSE(RAE) AT APPROX 2130 AFTER REPORT.

## 2019-07-18 NOTE — NUR
Assumed patient care around 2130. complained of pain in abdomen, pain
medication given and worked. vss, afebrile. bed rest.

## 2019-07-18 NOTE — NUR
Assumed care of pt 0700. Pt a&ox4. Chronic back pain controlled with prn pain
meds. Up w/1 assist and a walker and gaitbelt. Family at bedside. Fall
precautions in place. Pt discharged to [facility]. Report given.

## 2019-08-14 ENCOUNTER — HOSPITAL ENCOUNTER (OUTPATIENT)
Dept: HOSPITAL 61 - PCVCCLINIC | Age: 84
Discharge: HOME | End: 2019-08-14
Attending: INTERNAL MEDICINE
Payer: COMMERCIAL

## 2019-08-14 DIAGNOSIS — I49.5: ICD-10-CM

## 2019-08-14 DIAGNOSIS — I10: ICD-10-CM

## 2019-08-14 DIAGNOSIS — I42.9: Primary | ICD-10-CM

## 2019-08-14 DIAGNOSIS — I48.0: ICD-10-CM

## 2019-08-14 DIAGNOSIS — Z95.0: ICD-10-CM

## 2019-08-14 DIAGNOSIS — E11.9: ICD-10-CM

## 2019-08-14 DIAGNOSIS — I44.2: ICD-10-CM

## 2019-08-14 DIAGNOSIS — E78.00: ICD-10-CM

## 2019-08-14 PROCEDURE — 80061 LIPID PANEL: CPT

## 2019-08-14 PROCEDURE — 93280 PM DEVICE PROGR EVAL DUAL: CPT

## 2019-08-14 PROCEDURE — G0463 HOSPITAL OUTPT CLINIC VISIT: HCPCS

## 2019-08-14 PROCEDURE — 36415 COLL VENOUS BLD VENIPUNCTURE: CPT

## 2019-09-03 ENCOUNTER — HOSPITAL ENCOUNTER (OUTPATIENT)
Dept: HOSPITAL 35 - GI | Age: 84
Discharge: HOME | End: 2019-09-03
Attending: INTERNAL MEDICINE
Payer: COMMERCIAL

## 2019-09-03 VITALS — HEIGHT: 69.02 IN | BODY MASS INDEX: 27.11 KG/M2 | WEIGHT: 183.01 LBS

## 2019-09-03 DIAGNOSIS — K31.9: Primary | ICD-10-CM

## 2019-09-03 DIAGNOSIS — K21.0: ICD-10-CM

## 2019-09-03 DIAGNOSIS — Z87.19: ICD-10-CM

## 2019-09-03 DIAGNOSIS — Z79.899: ICD-10-CM

## 2019-09-03 DIAGNOSIS — E11.22: ICD-10-CM

## 2019-09-03 DIAGNOSIS — I12.9: ICD-10-CM

## 2019-09-03 DIAGNOSIS — N18.2: ICD-10-CM

## 2019-09-03 DIAGNOSIS — G47.33: ICD-10-CM

## 2019-09-03 DIAGNOSIS — Z98.890: ICD-10-CM

## 2019-09-03 DIAGNOSIS — I42.9: ICD-10-CM

## 2019-09-03 DIAGNOSIS — Z90.49: ICD-10-CM

## 2019-09-03 DIAGNOSIS — E78.00: ICD-10-CM

## 2019-09-03 DIAGNOSIS — I25.10: ICD-10-CM

## 2019-09-03 PROCEDURE — 62110: CPT

## 2019-09-03 PROCEDURE — 62900: CPT

## 2019-09-04 NOTE — PATH
[facility]
[address]
[place]   [address]                     PATHOLOGY RPT PROCEDURE       
_______________________________________________________________________________
 
Name:       ANTHONY GUZMAN ELIZA              Room #:                     [facility] 
M..#:      [id number]     Account #:      [id number]  
Admission:  09/03/19    Date of Birth:  [birth year]  
Discharge:                                              Report #:    [id number]
                                                        Path Case #: [id number]
_______________________________________________________________________________
 
[facility] Accession Number: [id number]
.                                                                01
Material submitted:                                        .
PART A: stomach - BX ANTRUM
PART B: esophagus - BX DISTAL ESOPHAGUS. Modifiers: distal
.                                                                01
Clinical history:                                          .
Esophagitis, hiatal hernia, gastritis
A.  Rule out H. pylori
B.  Rule out Tovar's
.                                                                02
**********************************************************************
Diagnosis:
A. Gastric mucosa, antrum rule out H. pylori, endoscopic biopsy:
- Moderate reactive gastropathy with focal active gastritis.
- Negative for intestinal metaplasia or atrophy.
- Negative for Helicobacter pylori (properly controlled
immunohistochemical stain performed).
.
B. Gastroesophageal mucosa, distal esophagus, rule out Tovar's,
endoscopic biopsy:
- Gastric cardia-type mucosa with early focal goblet cell change; negative
for Tovar's (definitive intestinal) metaplasia.
- Negative for dysplasia.
- Squamous mucosa with mild esophagitis and features compatible with
reflux esophagitis.
(IUV:pit; 09/04/2019)
QTP/09/04/2019
**********************************************************************
.                                                                02
Electronically signed:                                     .
Marta Estrada MD, Pathologist
NPI- [id number]
.                                                                01
Gross description:                                         .
A. The specimen is received in formalin, labeled "Anthony Guzman BX
antrum", are two irregular fragments of tan soft tissue measuring 0.5 cm
and 0.6 cm in greatest dimension.  Entirely submitted in A1.
.
B. The specimen is received in formalin, labeled "Anthony Guzman BX
distal esophagus", few irregular fragments of tan-gray soft tissue
measuring 0.5 x 0.4 x 0.1 cm in aggregate.  Entirely submitted in B1.
(SWS; 9/3/2019)
SHS/SHS
.                                                                02
Pathologist provided ICD-10:
K31.9, K29.70, K21.0
 
 
[address]   [address]                     PATHOLOGY RPT PROCEDURE       
_______________________________________________________________________________
 
Name:       ANTHONY GUZMAN              Room #:                     [facility].#:      [id number]     Account #:      [id number]  
Admission:  09/03/19    Date of Birth:  [birth year]  
Discharge:                                              Report #:    [id number]
                                                        Path Case #: [id number]
_______________________________________________________________________________
.                                                                02
CPT                                                        .
757853, [id number], [id number]
Specimen Comment: A courtesy copy of this report has been sent to
Specimen Comment: [phone], [phone].
Specimen Comment: Report sent to  / DR EPSTEIN
Specimen Comment: A duplicate report has been generated due to demographic
updates.
***Performed at:  01
   [address], [place]  [id number]
   MD Jayant Doll MD Phone:  [phone]
***Performed at:  02
   [address]  [id number]
   MD Marta Estrada MD Phone:  [phone]

## 2019-10-21 ENCOUNTER — HOSPITAL ENCOUNTER (OUTPATIENT)
Dept: HOSPITAL 35 - RAD | Age: 84
End: 2019-10-21
Attending: NURSE PRACTITIONER
Payer: COMMERCIAL

## 2019-10-21 DIAGNOSIS — M19.012: Primary | ICD-10-CM

## 2019-11-20 ENCOUNTER — HOSPITAL ENCOUNTER (OUTPATIENT)
Dept: HOSPITAL 61 - PCVCIMAG | Age: 84
Discharge: HOME | End: 2019-11-20
Attending: INTERNAL MEDICINE
Payer: COMMERCIAL

## 2019-11-20 DIAGNOSIS — I48.0: ICD-10-CM

## 2019-11-20 DIAGNOSIS — G47.33: ICD-10-CM

## 2019-11-20 DIAGNOSIS — I44.2: ICD-10-CM

## 2019-11-20 DIAGNOSIS — I08.1: Primary | ICD-10-CM

## 2019-11-20 DIAGNOSIS — I49.5: ICD-10-CM

## 2019-11-20 DIAGNOSIS — I10: ICD-10-CM

## 2019-11-20 DIAGNOSIS — Z95.0: ICD-10-CM

## 2019-11-20 DIAGNOSIS — E78.00: ICD-10-CM

## 2019-11-20 DIAGNOSIS — I42.8: ICD-10-CM

## 2019-11-20 PROCEDURE — 93306 TTE W/DOPPLER COMPLETE: CPT

## 2019-11-20 NOTE — PCVCIMAG
--------------- APPROVED REPORT --------------





Study performed:  11/20/2019 13:35:42



EXAM: Comprehensive 2D, Doppler, and color-flow 

Echocardiogram

Patient Location: Echo lab

Status:  routine



BSA:         1.99

HR: 60 bpm

Rhythm: NSR



Other Information 

Study Quality: Technically Difficult



Risk Factors: 

Cardiac Risk Factors:  

HTN



Indications

Pacemaker

Cardiomyopathy

PAF



2D Dimensions

IVSd:  12.89 (7-11mm)LVOT Diam:  23.72 (18-24mm) 

LVDd:  36.80 mm

PWd:  11.61 (7-11mm)Ascending Ao:  32.91 (22-36mm)

LVDs:  30.30 (25-40mm)

Left Atrium:  46.98 (27-40mm)

Aortic Root:  30.67 mm

LV Single Plane 4CH:  51.63 %

LV Single Plane 2CH:  53.23 %

Biplane EF:  52.6 %



Volumes

Left Atrial Volume (Systole)

Single Plane 4CH:  60.62 mLSingle Plane 2CH:  46.54 mL

LA ESV Index:  27.00 mL/m2



Aortic Valve

AoV Peak Tim.:  1.60 m/s

AO Peak Gr.:  10.24 mmHgLVOT Max PG:  3.37 mmHg

LVOT Max V:  0.92 m/s

DHAVAL Vmax: 2.53 cm2

AI Vmax:  4.16 m/s

AI [place]:  1.83 m/s2

AI PHT:  660.42 ms



Mitral Valve

E/A Ratio:  0.5

MV Decel. Time:  221.78 ms

MV E Max Tim.:  0.48 m/s

MV A Tim.:  0.99 m/s



Pulmonary Valve

PV Peak Gr.:  3.70 mmHg



Tricuspid Valve

TR Peak Tim.:  2.66 m/s

TR Peak Gr.:  28.38 mmHg



Left Ventricle

The left ventricle is normal size. Mild apical hypokinesis. There is 

normal left ventricular wall thickness. Left ventricular systolic 

function is borderline. LVEF is 50%. Grade I - abnormal relaxation 

pattern.



Right Ventricle

The right ventricle is normal size. The right ventricular systolic 

function is normal. Pacemaker lead is present in the right ventricle. 



Atria

The left atrium size is normal. Pacemaker lead is present in the 

right atrium.



Aortic Valve

The aortic valve is normal in structure. Trace aortic regurgitation. 

There is no aortic valvular stenosis.



Mitral Valve

The mitral valve is normal in structure. Mild mitral regurgitation. 

No evidence of mitral valve stenosis.



Tricuspid Valve

The tricuspid valve is normal in structure. Mild tricuspid 

regurgitation. Pulmonary artery pressure is 35mmHg.



Pulmonic Valve

The pulmonary valve is normal in structure. There is no pulmonic 

valvular regurgitation.



Great Vessels

The aortic root is normal in size. IVC is normal in size and 

collapses >50% with inspiration.



Pericardium

There is no pericardial effusion.



<Conclusion>

Normal echocardiogram.

The left ventricle is normal size.

Left ventricular systolic function is borderline.

LVEF is 50%.

Grade I - abnormal relaxation pattern.

The right ventricular systolic function is normal.

Pacemaker lead is present in the right ventricle.

Pacemaker lead is present in the right atrium.

Trace aortic regurgitation.

Mild mitral regurgitation.

Mild tricuspid regurgitation. Pulmonary artery pressure is 

35mmHg.

The aortic root is normal in size.

There is no pericardial effusion.

## 2020-02-19 ENCOUNTER — HOSPITAL ENCOUNTER (EMERGENCY)
Dept: HOSPITAL 35 - ER | Age: 85
Discharge: HOME | End: 2020-02-19
Payer: COMMERCIAL

## 2020-02-19 VITALS — SYSTOLIC BLOOD PRESSURE: 134 MMHG | DIASTOLIC BLOOD PRESSURE: 55 MMHG

## 2020-02-19 VITALS — BODY MASS INDEX: 26.07 KG/M2 | WEIGHT: 176 LBS | HEIGHT: 69 IN

## 2020-02-19 DIAGNOSIS — S83.8X2A: Primary | ICD-10-CM

## 2020-02-19 DIAGNOSIS — Z90.49: ICD-10-CM

## 2020-02-19 DIAGNOSIS — I10: ICD-10-CM

## 2020-02-19 DIAGNOSIS — I48.91: ICD-10-CM

## 2020-02-19 DIAGNOSIS — G89.29: ICD-10-CM
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
## 2020-02-19 DIAGNOSIS — E78.5: ICD-10-CM

## 2020-02-19 DIAGNOSIS — K21.9: ICD-10-CM
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
## 2020-02-19 DIAGNOSIS — M54.9: ICD-10-CM

## 2020-02-19 DIAGNOSIS — G47.30: ICD-10-CM
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
## 2020-02-19 DIAGNOSIS — E11.9: ICD-10-CM

## 2020-05-28 ENCOUNTER — HOSPITAL ENCOUNTER (OUTPATIENT)
Dept: HOSPITAL 35 - SJCVC | Age: 85
End: 2020-05-28
Attending: INTERNAL MEDICINE
Payer: COMMERCIAL

## 2020-05-28 DIAGNOSIS — I42.9: ICD-10-CM

## 2020-05-28 DIAGNOSIS — I49.5: ICD-10-CM

## 2020-05-28 DIAGNOSIS — Z79.899: ICD-10-CM

## 2020-05-28 DIAGNOSIS — I48.0: ICD-10-CM

## 2020-05-28 DIAGNOSIS — E78.5: ICD-10-CM

## 2020-05-28 DIAGNOSIS — E78.00: ICD-10-CM

## 2020-05-28 DIAGNOSIS — I25.10: ICD-10-CM

## 2020-05-28 DIAGNOSIS — Z45.018: Primary | ICD-10-CM

## 2020-05-28 DIAGNOSIS — E11.9: ICD-10-CM

## 2020-05-28 DIAGNOSIS — I10: ICD-10-CM

## 2021-06-10 ENCOUNTER — OFFICE VISIT (OUTPATIENT)
Dept: PODIATRY | Age: 86
End: 2021-06-10

## 2021-06-10 DIAGNOSIS — B35.1 DERMATOPHYTOSIS, NAIL: Primary | ICD-10-CM

## 2021-06-10 DIAGNOSIS — M79.671 BILATERAL FOOT PAIN: ICD-10-CM

## 2021-06-10 DIAGNOSIS — M79.672 BILATERAL FOOT PAIN: ICD-10-CM

## 2021-06-10 PROCEDURE — 11721 DEBRIDE NAIL 6 OR MORE: CPT | Performed by: PODIATRIST

## 2021-06-10 RX ORDER — ATORVASTATIN CALCIUM 40 MG/1
20 TABLET, FILM COATED ORAL DAILY
COMMUNITY
Start: 2021-03-30

## 2021-06-10 RX ORDER — FLUOROMETHOLONE 0.1 %
1 SUSPENSION, DROPS(FINAL DOSAGE FORM)(ML) OPHTHALMIC (EYE) DAILY
COMMUNITY

## 2021-06-10 RX ORDER — CYANOCOBALAMIN 1000 UG/ML
INJECTION, SOLUTION INTRAMUSCULAR; SUBCUTANEOUS
COMMUNITY
Start: 2020-11-21

## 2021-06-10 RX ORDER — EZETIMIBE AND SIMVASTATIN 10; 40 MG/1; MG/1
1 TABLET ORAL DAILY
COMMUNITY

## 2021-06-10 RX ORDER — LOSARTAN POTASSIUM 100 MG/1
100 TABLET ORAL DAILY
COMMUNITY
Start: 2021-03-31

## 2021-06-10 RX ORDER — APIXABAN 5 MG/1
5 TABLET, FILM COATED ORAL 2 TIMES DAILY
COMMUNITY
Start: 2021-06-02

## 2021-06-10 RX ORDER — GLIPIZIDE 5 MG/1
5 TABLET ORAL DAILY
COMMUNITY
Start: 2019-08-01

## 2021-06-10 RX ORDER — LOTEPREDNOL ETABONATE 5 MG/ML
1 SUSPENSION/ DROPS OPHTHALMIC DAILY
COMMUNITY

## 2021-06-10 RX ORDER — HYDROCODONE BITARTRATE AND ACETAMINOPHEN 5; 325 MG/1; MG/1
2 TABLET ORAL EVERY 6 HOURS PRN
COMMUNITY
Start: 2021-06-04 | End: 2021-07-04

## 2021-06-10 RX ORDER — CARVEDILOL 12.5 MG/1
12.5 TABLET ORAL
COMMUNITY

## 2021-08-31 ENCOUNTER — OFFICE VISIT (OUTPATIENT)
Dept: PODIATRY | Age: 86
End: 2021-08-31

## 2021-08-31 DIAGNOSIS — B35.1 DERMATOPHYTOSIS, NAIL: Primary | ICD-10-CM

## 2021-08-31 DIAGNOSIS — M79.672 BILATERAL FOOT PAIN: ICD-10-CM

## 2021-08-31 DIAGNOSIS — M79.671 BILATERAL FOOT PAIN: ICD-10-CM

## 2021-08-31 PROCEDURE — 11721 DEBRIDE NAIL 6 OR MORE: CPT | Performed by: PODIATRIST

## 2021-11-23 ENCOUNTER — OFFICE VISIT (OUTPATIENT)
Dept: PODIATRY | Age: 86
End: 2021-11-23

## 2021-11-23 DIAGNOSIS — M79.672 BILATERAL FOOT PAIN: ICD-10-CM

## 2021-11-23 DIAGNOSIS — B35.1 DERMATOPHYTOSIS, NAIL: Primary | ICD-10-CM

## 2021-11-23 DIAGNOSIS — M79.671 BILATERAL FOOT PAIN: ICD-10-CM

## 2021-11-23 RX ORDER — FUROSEMIDE 40 MG/1
TABLET ORAL
COMMUNITY
Start: 2021-10-04

## 2021-11-23 RX ORDER — ATORVASTATIN CALCIUM 20 MG/1
TABLET, FILM COATED ORAL
COMMUNITY
Start: 2021-10-13 | End: 2021-11-23 | Stop reason: ALTCHOICE

## 2021-11-23 RX ORDER — HYDROCODONE BITARTRATE AND ACETAMINOPHEN 5; 325 MG/1; MG/1
TABLET ORAL
COMMUNITY
Start: 2021-11-12 | End: 2021-12-08

## 2021-11-23 RX ORDER — VALSARTAN 80 MG/1
80 TABLET ORAL DAILY
COMMUNITY

## 2021-11-23 RX ORDER — METFORMIN HYDROCHLORIDE 500 MG/1
TABLET, EXTENDED RELEASE ORAL
COMMUNITY
Start: 2021-11-15 | End: 2021-12-12

## 2022-02-01 ENCOUNTER — OFFICE VISIT (OUTPATIENT)
Dept: PODIATRY | Age: 87
End: 2022-02-01

## 2022-02-01 DIAGNOSIS — M79.672 BILATERAL FOOT PAIN: ICD-10-CM

## 2022-02-01 DIAGNOSIS — B35.1 DERMATOPHYTOSIS, NAIL: Primary | ICD-10-CM

## 2022-02-01 DIAGNOSIS — M79.671 BILATERAL FOOT PAIN: ICD-10-CM

## 2022-02-01 PROCEDURE — 11721 DEBRIDE NAIL 6 OR MORE: CPT | Performed by: PODIATRIST

## 2022-03-01 ENCOUNTER — LAB REQUISITION (OUTPATIENT)
Dept: LAB | Age: 87
End: 2022-03-01

## 2022-03-01 DIAGNOSIS — R30.0 DYSURIA: ICD-10-CM

## 2022-03-01 DIAGNOSIS — N39.0 URINARY TRACT INFECTION, SITE NOT SPECIFIED: ICD-10-CM

## 2022-03-01 PROCEDURE — PSEU9005 URINE, BACTERIAL CULTURE: Performed by: CLINICAL MEDICAL LABORATORY

## 2022-03-01 PROCEDURE — 87086 URINE CULTURE/COLONY COUNT: CPT | Performed by: CLINICAL MEDICAL LABORATORY

## 2022-03-03 LAB — BACTERIA UR CULT: NORMAL

## 2022-04-18 ENCOUNTER — APPOINTMENT (OUTPATIENT)
Dept: PODIATRY | Age: 87
End: 2022-04-18